# Patient Record
Sex: MALE | Race: WHITE | URBAN - METROPOLITAN AREA
[De-identification: names, ages, dates, MRNs, and addresses within clinical notes are randomized per-mention and may not be internally consistent; named-entity substitution may affect disease eponyms.]

---

## 2017-04-12 ENCOUNTER — OFFICE VISIT (OUTPATIENT)
Dept: CARDIOLOGY CLINIC | Age: 62
End: 2017-04-12

## 2017-04-12 VITALS
WEIGHT: 184 LBS | HEIGHT: 77 IN | OXYGEN SATURATION: 95 % | BODY MASS INDEX: 21.73 KG/M2 | DIASTOLIC BLOOD PRESSURE: 80 MMHG | HEART RATE: 84 BPM | SYSTOLIC BLOOD PRESSURE: 130 MMHG

## 2017-04-12 DIAGNOSIS — I48.0 PAROXYSMAL ATRIAL FIBRILLATION (HCC): Primary | ICD-10-CM

## 2017-04-12 DIAGNOSIS — Z79.899 LONG TERM CURRENT USE OF AMIODARONE: ICD-10-CM

## 2017-04-12 DIAGNOSIS — I42.9 CARDIOMYOPATHY (HCC): ICD-10-CM

## 2017-04-12 PROCEDURE — 93000 ELECTROCARDIOGRAM COMPLETE: CPT | Performed by: INTERNAL MEDICINE

## 2017-04-12 PROCEDURE — 99214 OFFICE O/P EST MOD 30 MIN: CPT | Performed by: INTERNAL MEDICINE

## 2017-04-12 RX ORDER — AMIODARONE HYDROCHLORIDE 200 MG/1
100 TABLET ORAL EVERY OTHER DAY
Qty: 15 TABLET | Refills: 6 | Status: SHIPPED | OUTPATIENT
Start: 2017-04-12 | End: 2017-07-25 | Stop reason: SDUPTHER

## 2017-04-12 ASSESSMENT — ENCOUNTER SYMPTOMS
BLOOD IN STOOL: 0
COUGH: 0
EYE REDNESS: 0
ABDOMINAL DISTENTION: 0
SHORTNESS OF BREATH: 0
WHEEZING: 0

## 2017-06-26 RX ORDER — METOPROLOL SUCCINATE 25 MG/1
25 TABLET, EXTENDED RELEASE ORAL DAILY
Qty: 30 TABLET | Refills: 6 | Status: SHIPPED | OUTPATIENT
Start: 2017-06-26 | End: 2018-01-29 | Stop reason: SDUPTHER

## 2017-07-25 RX ORDER — AMIODARONE HYDROCHLORIDE 200 MG/1
100 TABLET ORAL EVERY OTHER DAY
Qty: 15 TABLET | Refills: 6 | Status: SHIPPED | OUTPATIENT
Start: 2017-07-25 | End: 2018-10-03 | Stop reason: SDUPTHER

## 2017-10-12 ENCOUNTER — OFFICE VISIT (OUTPATIENT)
Dept: CARDIOLOGY CLINIC | Age: 62
End: 2017-10-12

## 2017-10-12 VITALS
HEIGHT: 77 IN | BODY MASS INDEX: 20.12 KG/M2 | SYSTOLIC BLOOD PRESSURE: 120 MMHG | OXYGEN SATURATION: 95 % | HEART RATE: 66 BPM | WEIGHT: 170.4 LBS | DIASTOLIC BLOOD PRESSURE: 82 MMHG

## 2017-10-12 DIAGNOSIS — I48.0 PAROXYSMAL ATRIAL FIBRILLATION (HCC): Primary | ICD-10-CM

## 2017-10-12 DIAGNOSIS — Z79.899 LONG TERM CURRENT USE OF AMIODARONE: ICD-10-CM

## 2017-10-12 DIAGNOSIS — I42.9 CARDIOMYOPATHY, UNSPECIFIED TYPE (HCC): ICD-10-CM

## 2017-10-12 PROCEDURE — 99214 OFFICE O/P EST MOD 30 MIN: CPT | Performed by: INTERNAL MEDICINE

## 2017-10-12 PROCEDURE — 93000 ELECTROCARDIOGRAM COMPLETE: CPT | Performed by: INTERNAL MEDICINE

## 2017-10-12 RX ORDER — FERROUS SULFATE 325(65) MG
325 TABLET ORAL 2 TIMES DAILY
COMMUNITY
End: 2018-04-12 | Stop reason: ALTCHOICE

## 2017-10-12 RX ORDER — ESCITALOPRAM OXALATE 5 MG/1
5 TABLET ORAL DAILY
COMMUNITY
End: 2018-04-12 | Stop reason: ALTCHOICE

## 2017-10-12 RX ORDER — SULFAMETHOXAZOLE AND TRIMETHOPRIM 800; 160 MG/1; MG/1
1 TABLET ORAL PRN
COMMUNITY

## 2017-10-12 ASSESSMENT — ENCOUNTER SYMPTOMS
SHORTNESS OF BREATH: 0
ABDOMINAL DISTENTION: 0
BLOOD IN STOOL: 0
EYE REDNESS: 0
COUGH: 0
WHEEZING: 0

## 2017-10-12 NOTE — PATIENT INSTRUCTIONS
shot. If you have had one before, ask your doctor whether you need another dose. Get a flu shot every year. If you must be around people with colds or flu, wash your hands often. Activity  · If your doctor recommends it, get more exercise. Walking is a good choice. Bit by bit, increase the amount you walk every day. Try for at least 30 minutes on most days of the week. You also may want to swim, bike, or do other activities. Your doctor may suggest that you join a cardiac rehabilitation program so that you can have help increasing your physical activity safely. · Start light exercise if your doctor says it is okay. Even a small amount will help you get stronger, have more energy, and manage stress. Walking is an easy way to get exercise. Start out by walking a little more than you did in the hospital. Gradually increase the amount you walk. · When you exercise, watch for signs that your heart is working too hard. You are pushing too hard if you cannot talk while you are exercising. If you become short of breath or dizzy or have chest pain, sit down and rest immediately. · Check your pulse regularly. Place two fingers on the artery at the palm side of your wrist, in line with your thumb. If your heartbeat seems uneven or fast, talk to your doctor. When should you call for help? Call 911 anytime you think you may need emergency care. For example, call if:  · You have symptoms of a heart attack. These may include:  ¨ Chest pain or pressure, or a strange feeling in the chest.  ¨ Sweating. ¨ Shortness of breath. ¨ Nausea or vomiting. ¨ Pain, pressure, or a strange feeling in the back, neck, jaw, or upper belly or in one or both shoulders or arms. ¨ Lightheadedness or sudden weakness. ¨ A fast or irregular heartbeat. After you call 911, the  may tell you to chew 1 adult-strength or 2 to 4 low-dose aspirin. Wait for an ambulance. Do not try to drive yourself. · You have symptoms of a stroke.  These may include:  ¨ Sudden numbness, tingling, weakness, or loss of movement in your face, arm, or leg, especially on only one side of your body. ¨ Sudden vision changes. ¨ Sudden trouble speaking. ¨ Sudden confusion or trouble understanding simple statements. ¨ Sudden problems with walking or balance. ¨ A sudden, severe headache that is different from past headaches. · You passed out (lost consciousness). Call your doctor now or seek immediate medical care if:  · You have new or increased shortness of breath. · You feel dizzy or lightheaded, or you feel like you may faint. · Your heart rate becomes irregular. · You can feel your heart flutter in your chest or skip heartbeats. Tell your doctor if these symptoms are new or worse. Watch closely for changes in your health, and be sure to contact your doctor if you have any problems. Where can you learn more? Go to https://[email or web address].HipLogic. org and sign in to your [email or web address] account. Enter U020 in the travelfox box to learn more about \"Atrial Fibrillation: Care Instructions. \"     If you do not have an account, please click on the \"Sign Up Now\" link. Current as of: September 21, 2016  Content Version: 11.3  © 3671-7037 [employer], [employer]. Care instructions adapted under license by [facility] ([facility]). If you have questions about a medical condition or this instruction, always ask your healthcare professional. [place] [address] any warranty or liability for your use of this information.

## 2017-10-12 NOTE — LETTER
[address] Cardiology - [address] [address]  [address] [place] [address]  [address] [address]  Phone: [phone]  Fax: [phone]    Abiola Son MD        October 12, 2017     David Walton    Patient: Nabil Middleton  MR Number: [id number]  YOB: 1955  Date of Visit: 10/12/2017    Dear Dr. Walker Areas:    HPI Nabil Middleton denies chest pain, palpitations, dizziness, syncope, leg swelling and increased dyspnea. He ambulates with a cane. He is tolerating his medications. He drinks 3-4 beers per day. Assessment:        Cardiomyopathy ([address]      Echo at PCP's office 10/2015-LVEF 25-30%. Nuc GXT 11/2015 57%. Cath 12/2015 LVEF 50%.  Atrial fibrillation ([address]- ?secondary to tachycardia or Etoh. CHADS VASC 0. TSH, Mg and K levels normal. On amiodarone for 2 episodes of symptomatic atrail fib. Consider switching to Flecainide in future. On ASA 325mg daily. EKG 4/2017 NSR. TSH normal. EKG 10/2017 NSR.  DVT (deep venous thrombosis) (HCC)      hx of, preivously on coumadin.  H/O spinal cord injury      with partial paralysis    ETOH abuse      avoidance discussed.  History of tobacco use      quit smoking    Leg swelling      chronic RLE edema per pt.  Polycystic kidney disease, Cr normal.          Chest pain, atypical.  Nuc GXT 11/2015 IWMI, no ischemia, LVEF 57%. Cath 12/2015 patent coronaries, LVEF 50%. Hx spinal cord injury. PLAN  Clinically stable. Currently in NSR. No evidence of CHF. No angina. Continue current medical therapy and ongoing risk factor modification. CMP, TSH, T3, T4 prior to next visit. Etoh avoidance discussed. If you have questions, please do not hesitate to call me. I look forward to following Nicole Grove along with you.     Sincerely,        Abiola Son MD

## 2017-10-12 NOTE — PROGRESS NOTES
Subjective:      Patient ID: Elmer Simmonds is a 58 y.o. male. Reason for visit: f/u atrial fib  CC: \"I am doing OK    HPI Elmer Simmonds denies chest pain, palpitations, dizziness, syncope, leg swelling and increased dyspnea. He ambulates with a cane. He is tolerating his medications. He drinks 3-4 beers per day. Review of Systems   Constitutional: Negative for activity change, appetite change, chills, fatigue, fever and unexpected weight change. HENT: Negative for congestion, nosebleeds and tinnitus. Eyes: Negative for redness and visual disturbance. Respiratory: Negative for cough, shortness of breath and wheezing. Cardiovascular: Negative for chest pain, palpitations and leg swelling. Gastrointestinal: Negative for abdominal distention and blood in stool. Genitourinary: Negative for dysuria and hematuria. Musculoskeletal: Negative for gait problem and myalgias. Neurological: Negative for dizziness and speech difficulty. Hematological: Does not bruise/bleed easily. Psychiatric/Behavioral: Negative for behavioral problems and confusion. All other systems reviewed and are negative. Objective:   Physical Exam   Constitutional: He is oriented to person, place, and time. He appears well-developed and well-nourished. HENT:   Head: Normocephalic and atraumatic. Eyes: Conjunctivae are normal. Right eye exhibits no discharge. Left eye exhibits no discharge. Neck: Normal range of motion. Neck supple. Cardiovascular: Normal rate, regular rhythm, normal heart sounds and intact distal pulses. Pulmonary/Chest: Effort normal and breath sounds normal.   Abdominal: Soft. Bowel sounds are normal.   Musculoskeletal: Normal range of motion. He exhibits no edema. Neurological: He is alert and oriented to person, place, and time. Skin: Skin is warm and dry. Psychiatric: He has a normal mood and affect. His behavior is normal.   Nursing note and vitals reviewed.     Blood pressure 120/82, pulse 66, height 6' 5\" (1.956 m), weight 170 lb 6.4 oz (77.3 kg), SpO2 95 %. Vitals:    10/12/17 1232   BP: 120/82   Site: Left Arm   Position: Sitting   Cuff Size: Medium Adult   Pulse: 66   SpO2: 95%   Weight: 170 lb 6.4 oz (77.3 kg)   Height: 6' 5\" (1.956 m)     Body mass index is 20.21 kg/m². Wt Readings from Last 3 Encounters:   10/12/17 170 lb 6.4 oz (77.3 kg)   04/12/17 184 lb (83.5 kg)   10/05/16 172 lb (78 kg)     BP Readings from Last 3 Encounters:   10/12/17 120/82   04/12/17 130/80   10/05/16 136/86        Current Outpatient Prescriptions   Medication Sig Dispense Refill    escitalopram (LEXAPRO) 5 MG tablet Take 5 mg by mouth daily      ferrous sulfate 325 (65 Fe) MG tablet Take 325 mg by mouth 2 times daily      sulfamethoxazole-trimethoprim (BACTRIM DS;SEPTRA DS) 800-160 MG per tablet Take 1 tablet by mouth as needed      amiodarone (CORDARONE) 200 MG tablet Take 0.5 tablets by mouth every other day 15 tablet 6    metoprolol succinate (TOPROL XL) 25 MG extended release tablet Take 1 tablet by mouth daily 30 tablet 6    aspirin 325 MG tablet Take 325 mg by mouth daily       No current facility-administered medications for this visit. Past Surgical History:   Procedure Laterality Date    EYE MUSCLE SURGERY      HERNIA REPAIR      JOINT REPLACEMENT      NECK SURGERY      PARTIAL HIP ARTHROPLASTY       Social History   Substance Use Topics    Smoking status: Former Smoker     Packs/day: 1.50     Years: 10.00     Quit date: 3/23/1986    Smokeless tobacco: Former User     Quit date: 3/23/2004    Alcohol use 12.6 oz/week     21 Cans of beer per week      Comment: about 3 beers qd      No Known Allergies   Family History   Problem Relation Age of Onset    Cancer Father      cancerous rib, lung & skin cancer    Cancer Mother      ovarian    Cancer Brother      lung        Recent labs and imaging reviewed.      Assessment:        Cardiomyopathy ([facility]      Echo at PCP's office 10/2015-LVEF 25-30%. Nuc GXT 11/2015 57%. Cath 12/2015 LVEF 50%.  Atrial fibrillation ([address]- ?secondary to tachycardia or Etoh. CHADS VASC 0. TSH, Mg and K levels normal. On amiodarone for 2 episodes of symptomatic atrail fib. Consider switching to Flecainide in future. On ASA 325mg daily. EKG 4/2017 NSR. TSH normal. EKG 10/2017 NSR.  DVT (deep venous thrombosis) (HCC)      hx of, preivously on coumadin.  H/O spinal cord injury      with partial paralysis    ETOH abuse      avoidance discussed.  History of tobacco use      quit smoking    Leg swelling      chronic RLE edema per pt.  Polycystic kidney disease, Cr normal.          Chest pain, atypical.  Nuc GXT 11/2015 IWMI, no ischemia, LVEF 57%. Cath 12/2015 patent coronaries, LVEF 50%. Hx spinal cord injury. PLAN  Clinically stable. Currently in NSR. No evidence of CHF. No angina. Continue current medical therapy and ongoing risk factor modification. CMP, TSH, T3, T4 prior to next visit. Etoh avoidance discussed.

## 2018-01-29 ENCOUNTER — TELEPHONE (OUTPATIENT)
Dept: CARDIOLOGY CLINIC | Age: 63
End: 2018-01-29

## 2018-01-29 RX ORDER — METOPROLOL SUCCINATE 25 MG/1
25 TABLET, EXTENDED RELEASE ORAL DAILY
Qty: 90 TABLET | Refills: 3 | Status: SHIPPED | OUTPATIENT
Start: 2018-01-29 | End: 2018-04-12 | Stop reason: SDUPTHER

## 2018-01-29 NOTE — TELEPHONE ENCOUNTER
Patient needs a refill on Metoprolol 25 mg daily #90 called to [facility] in [place]

## 2018-01-29 NOTE — TELEPHONE ENCOUNTER
Patient needs a refill on Metoprolol 25 mg daily #90 called to [facility] in [place].

## 2018-04-12 ENCOUNTER — OFFICE VISIT (OUTPATIENT)
Dept: CARDIOLOGY CLINIC | Age: 63
End: 2018-04-12

## 2018-04-12 VITALS
HEIGHT: 77 IN | WEIGHT: 186 LBS | OXYGEN SATURATION: 96 % | HEART RATE: 108 BPM | DIASTOLIC BLOOD PRESSURE: 82 MMHG | SYSTOLIC BLOOD PRESSURE: 126 MMHG | BODY MASS INDEX: 21.96 KG/M2

## 2018-04-12 DIAGNOSIS — Z79.899 LONG TERM CURRENT USE OF AMIODARONE: ICD-10-CM

## 2018-04-12 DIAGNOSIS — I48.0 PAROXYSMAL ATRIAL FIBRILLATION (HCC): ICD-10-CM

## 2018-04-12 DIAGNOSIS — I42.9 CARDIOMYOPATHY, UNSPECIFIED TYPE (HCC): Primary | ICD-10-CM

## 2018-04-12 DIAGNOSIS — E78.00 PURE HYPERCHOLESTEROLEMIA: ICD-10-CM

## 2018-04-12 PROCEDURE — 99214 OFFICE O/P EST MOD 30 MIN: CPT | Performed by: INTERNAL MEDICINE

## 2018-04-12 PROCEDURE — 93000 ELECTROCARDIOGRAM COMPLETE: CPT | Performed by: INTERNAL MEDICINE

## 2018-04-12 RX ORDER — METOPROLOL SUCCINATE 50 MG/1
50 TABLET, EXTENDED RELEASE ORAL DAILY
Qty: 90 TABLET | Refills: 3 | Status: SHIPPED | OUTPATIENT
Start: 2018-04-12 | End: 2019-04-29 | Stop reason: SDUPTHER

## 2018-04-12 ASSESSMENT — ENCOUNTER SYMPTOMS
EYE REDNESS: 0
BLOOD IN STOOL: 0
COUGH: 0
SHORTNESS OF BREATH: 0
ABDOMINAL DISTENTION: 0
WHEEZING: 0

## 2018-10-03 RX ORDER — AMIODARONE HYDROCHLORIDE 200 MG/1
TABLET ORAL
Qty: 23 TABLET | Refills: 3 | Status: SHIPPED | OUTPATIENT
Start: 2018-10-03 | End: 2019-05-07 | Stop reason: SDUPTHER

## 2018-10-24 ASSESSMENT — ENCOUNTER SYMPTOMS
NAUSEA: 0
WHEEZING: 0
BLOOD IN STOOL: 0
EYE REDNESS: 0
SHORTNESS OF BREATH: 0
COUGH: 0

## 2018-10-24 NOTE — PROGRESS NOTES
Mihaela Koo is a 61 y.o. male    Reason for Visit: f/u atrial fib, CM  CC: \"I am doing OK \"    HPI Mihaela Koo denies chest pain, palpitations, dizziness, syncope, leg swelling and increased dyspnea. He ambulates with a walker due to his spinal injury. Review of Systems   Constitutional: Negative for chills, diaphoresis and fever. HENT: Negative for congestion, nosebleeds and tinnitus. Eyes: Negative for redness. Respiratory: Negative for cough, shortness of breath and wheezing. Cardiovascular: Negative for chest pain, palpitations and leg swelling. Gastrointestinal: Negative for blood in stool and nausea. Genitourinary: Negative for dysuria and hematuria. Musculoskeletal: Negative for myalgias and neck pain. Neurological: Negative for dizziness. Hematological: Does not bruise/bleed easily. Physical Exam   Constitutional: He is oriented to person, place, and time. He appears well-developed and well-nourished. HENT:   Head: Normocephalic and atraumatic. Eyes: Conjunctivae and EOM are normal.   Neck: Normal range of motion. Neck supple. Cardiovascular: Normal rate, regular rhythm, S1 normal, S2 normal and normal heart sounds. Exam reveals no gallop. No murmur heard. Pulmonary/Chest: Effort normal and breath sounds normal.   Abdominal: Soft. Bowel sounds are normal.   Musculoskeletal: Normal range of motion. Neurological: He is alert and oriented to person, place, and time. Right hemiparesis   Skin: Skin is warm and dry. Psychiatric: He has a normal mood and affect. His behavior is normal.   Nursing note and vitals reviewed.       Wt Readings from Last 3 Encounters:   10/25/18 183 lb (83 kg)   04/12/18 186 lb (84.4 kg)   10/12/17 170 lb 6.4 oz (77.3 kg)     BP Readings from Last 3 Encounters:   10/25/18 124/88   04/12/18 126/82   10/12/17 120/82     Pulse Readings from Last 3 Encounters:   10/25/18 96   04/12/18 108   10/12/17 66         Current Outpatient

## 2018-10-25 ENCOUNTER — OFFICE VISIT (OUTPATIENT)
Dept: CARDIOLOGY CLINIC | Age: 63
End: 2018-10-25
Payer: MEDICARE

## 2018-10-25 VITALS
OXYGEN SATURATION: 93 % | HEART RATE: 96 BPM | DIASTOLIC BLOOD PRESSURE: 88 MMHG | HEIGHT: 77 IN | SYSTOLIC BLOOD PRESSURE: 124 MMHG | WEIGHT: 183 LBS | BODY MASS INDEX: 21.61 KG/M2

## 2018-10-25 DIAGNOSIS — Z79.899 LONG TERM CURRENT USE OF AMIODARONE: ICD-10-CM

## 2018-10-25 DIAGNOSIS — I42.9 CARDIOMYOPATHY, UNSPECIFIED TYPE (HCC): Primary | ICD-10-CM

## 2018-10-25 DIAGNOSIS — I48.0 PAROXYSMAL ATRIAL FIBRILLATION (HCC): ICD-10-CM

## 2018-10-25 PROCEDURE — 93000 ELECTROCARDIOGRAM COMPLETE: CPT | Performed by: INTERNAL MEDICINE

## 2018-10-25 PROCEDURE — 99214 OFFICE O/P EST MOD 30 MIN: CPT | Performed by: INTERNAL MEDICINE

## 2018-10-25 NOTE — LETTER
procedures, and medical decision making performed by me. If you have questions, please do not hesitate to call me. I look forward to following Uriel Coffman along with you.     Sincerely,        Miki Westbrook MD

## 2019-04-26 ENCOUNTER — TELEPHONE (OUTPATIENT)
Dept: CARDIOLOGY CLINIC | Age: 64
End: 2019-04-26

## 2019-04-29 ENCOUNTER — OFFICE VISIT (OUTPATIENT)
Dept: CARDIOLOGY CLINIC | Age: 64
End: 2019-04-29
Payer: COMMERCIAL

## 2019-04-29 VITALS
WEIGHT: 187.8 LBS | HEIGHT: 77 IN | BODY MASS INDEX: 22.17 KG/M2 | DIASTOLIC BLOOD PRESSURE: 60 MMHG | HEART RATE: 103 BPM | OXYGEN SATURATION: 95 % | SYSTOLIC BLOOD PRESSURE: 100 MMHG

## 2019-04-29 DIAGNOSIS — I48.0 PAROXYSMAL ATRIAL FIBRILLATION (HCC): Primary | ICD-10-CM

## 2019-04-29 DIAGNOSIS — Z79.899 LONG TERM CURRENT USE OF AMIODARONE: ICD-10-CM

## 2019-04-29 DIAGNOSIS — I42.9 CARDIOMYOPATHY, UNSPECIFIED TYPE (HCC): ICD-10-CM

## 2019-04-29 PROCEDURE — 99214 OFFICE O/P EST MOD 30 MIN: CPT | Performed by: INTERNAL MEDICINE

## 2019-04-29 PROCEDURE — 93000 ELECTROCARDIOGRAM COMPLETE: CPT | Performed by: INTERNAL MEDICINE

## 2019-04-29 RX ORDER — METOPROLOL SUCCINATE 50 MG/1
50 TABLET, EXTENDED RELEASE ORAL DAILY
Qty: 90 TABLET | Refills: 3 | Status: SHIPPED | OUTPATIENT
Start: 2019-04-29 | End: 2019-05-07 | Stop reason: SDUPTHER

## 2019-04-29 ASSESSMENT — ENCOUNTER SYMPTOMS
BLOOD IN STOOL: 0
SHORTNESS OF BREATH: 0
EYE REDNESS: 0
WHEEZING: 0
COUGH: 0
NAUSEA: 0

## 2019-04-29 NOTE — PROGRESS NOTES
Gabby Alex is a 61 y.o. male    Reason for Visit: f/u atrial fib, CM    HPI Gabby Alex denies chest pain, palpitations, dizziness, syncope, leg swelling and increased dyspnea. He ambulates with a cane due to his spinal injury. Review of Systems   Constitutional: Negative for chills, diaphoresis and fever. HENT: Negative for congestion, nosebleeds and tinnitus. Eyes: Negative for redness. Respiratory: Negative for cough, shortness of breath and wheezing. Cardiovascular: Negative for chest pain, palpitations and leg swelling. Gastrointestinal: Negative for blood in stool and nausea. Genitourinary: Negative for dysuria and hematuria. Musculoskeletal: Negative for myalgias and neck pain. Neurological: Negative for dizziness. Hematological: Does not bruise/bleed easily. Physical Exam   Constitutional: He is oriented to person, place, and time. He appears well-developed and well-nourished. HENT:   Head: Normocephalic and atraumatic. Eyes: Conjunctivae and EOM are normal.   Neck: Normal range of motion. Neck supple. Cardiovascular: Regular rhythm, S1 normal, S2 normal and normal heart sounds. Exam reveals no gallop. No murmur heard. Tachycardic   Pulmonary/Chest: Effort normal and breath sounds normal.   Abdominal: Soft. Bowel sounds are normal.   Musculoskeletal: Normal range of motion. Neurological: He is alert and oriented to person, place, and time. Right hemiparesis   Skin: Skin is warm and dry. Psychiatric: He has a normal mood and affect. His behavior is normal.   Nursing note and vitals reviewed.       Wt Readings from Last 3 Encounters:   04/29/19 187 lb 12.8 oz (85.2 kg)   10/25/18 183 lb (83 kg)   04/12/18 186 lb (84.4 kg)     BP Readings from Last 3 Encounters:   04/29/19 100/60   10/25/18 124/88   04/12/18 126/82     Pulse Readings from Last 3 Encounters:   04/29/19 103   10/25/18 96   04/12/18 108         Current Outpatient Medications:     amiodarone (CORDARONE) 200 MG tablet, TAKE ONE-HALF TABLET BY MOUTH EVERY OTHER DAY, Disp: 23 tablet, Rfl: 3    metoprolol succinate (TOPROL XL) 50 MG extended release tablet, Take 1 tablet by mouth daily, Disp: 90 tablet, Rfl: 3    sulfamethoxazole-trimethoprim (BACTRIM DS;SEPTRA DS) 800-160 MG per tablet, Take 1 tablet by mouth as needed, Disp: , Rfl:     aspirin 325 MG tablet, Take 325 mg by mouth daily, Disp: , Rfl:     Past Medical History:   Diagnosis Date    Atrial fibrillation (HCC)     Brief episodes, CHADS VASC 0. TSH, Mg and K levels normal. On ASA 325mg daily.  Cardiomyopathy ([address]     Echo 10/2015-    DVT (deep venous thrombosis) (HCC)     hx of, preivously on coumadin.  ETOH abuse     moderation discussed.  H/O spinal cord injury     with partial paralysis    History of tobacco use     quit smoking    Leg swelling     chronic RLE edema per pt.  Polycystic kidney disease        Social History     Socioeconomic History    Marital status:      Spouse name: None    Number of children: None    Years of education: None    Highest education level: None   Occupational History    None   Social Needs    Financial resource strain: None    Food insecurity:     Worry: None     Inability: None    Transportation needs:     Medical: None     Non-medical: None   Tobacco Use    Smoking status: Former Smoker     Packs/day: 1.50     Years: 10.00     Pack years: 15.00     Last attempt to quit: 3/23/1986     Years since quittin.1    Smokeless tobacco: Former User     Quit date: 3/23/2004   Substance and Sexual Activity    Alcohol use:  Yes     Alcohol/week: 12.6 oz     Types: 21 Cans of beer per week     Comment: about 3 beers qd     Drug use: No    Sexual activity: None   Lifestyle    Physical activity:     Days per week: None     Minutes per session: None    Stress: None   Relationships    Social connections:     Talks on phone: None     Gets together: None     Attends Mormon service: None     Active member of club or organization: None     Attends meetings of clubs or organizations: None     Relationship status: None    Intimate partner violence:     Fear of current or ex partner: None     Emotionally abused: None     Physically abused: None     Forced sexual activity: None   Other Topics Concern    None   Social History Narrative    None       Past Surgical History:   Procedure Laterality Date    EYE MUSCLE SURGERY      HERNIA REPAIR      JOINT REPLACEMENT      NECK SURGERY      PARTIAL HIP ARTHROPLASTY         Family History   Problem Relation Age of Onset    Cancer Father         cancerous rib, lung & skin cancer    Cancer Mother         ovarian    Cancer Brother         lung       No Known Allergies    Recent Labs and Imaging reviewed    Assessment   Cardiomyopathy ([address]      Echo at PCP's office 10/2015-LVEF 25-30%. Nuc GXT 11/2015 57%. Cath 12/2015 LVEF 50%.  Atrial fibrillation ([address]- ?secondary to tachycardia or Etoh. CHADS VASC 1. TSH, Mg and K levels normal. On amiodarone for 2 episodes of symptomatic atrail fib. Consider switching to Flecainide in future. On ASA 325mg daily. EKG 10/25/18 NSR. TSH normal 4/2019. EKG 4/29/19 ST.     DVT (deep venous thrombosis) (HCC)      hx of.    H/O spinal cord injury      with partial paralysis.  ETOH abuse      avoidance discussed.  History of tobacco use      quit smoking    Leg swelling      chronic RLE edema per pt.  Polycystic kidney disease, Cr normal.          Chest pain, atypical.  Nuc GXT 11/2015 IWMI, no ischemia, LVEF 57%. Cath 12/2015 patent coronaries, LVEF 50%. Plan  EKG today confirms ST. CHF appears to be compensated. No angina. Continue ASA, amiodarone 100mg QOD and BB. Recommend repeating echocardiogram to evaluate progression of CM. CMP and TSH prior to next visit. Return in about 6 months (around 10/29/2019).     This note was scribed in the presence of the physician by Jaspreet Guerin RN. The scribes documentation has been prepared under my direction and personally reviewed by me in its entirety. I confirm that the note above accurately reflects all work, treatment, procedures, and medical decision making performed by me.

## 2019-04-29 NOTE — PATIENT INSTRUCTIONS

## 2019-04-29 NOTE — LETTER
[address] Cardiology - [facility] [address]  [address]   [address] [address]  Phone: [phone]  Fax: [phone]    Hesham Castañeda MD        May 1, 2019     Miriam Calvillo  [address] [place], [place] [address]    Patient: Ottoniel Dickerson  MR Number: [id number]  YOB: 1955  Date of Visit: 4/29/2019    Dear Dr. Miriam Calvillo:    Thank you for your referral. Progress note attached in visit summary. If you have questions, please do not hesitate to call me. I look forward to following Maura Rothman along with you.     Sincerely,        Hesham Castañeda MD

## 2019-05-07 RX ORDER — AMIODARONE HYDROCHLORIDE 200 MG/1
TABLET ORAL
Qty: 23 TABLET | Refills: 3 | Status: SHIPPED | OUTPATIENT
Start: 2019-05-07 | End: 2019-05-10 | Stop reason: SDUPTHER

## 2019-05-07 RX ORDER — METOPROLOL SUCCINATE 50 MG/1
50 TABLET, EXTENDED RELEASE ORAL DAILY
Qty: 90 TABLET | Refills: 3 | Status: SHIPPED | OUTPATIENT
Start: 2019-05-07 | End: 2019-05-10 | Stop reason: SDUPTHER

## 2019-05-10 RX ORDER — METOPROLOL SUCCINATE 50 MG/1
50 TABLET, EXTENDED RELEASE ORAL DAILY
Qty: 90 TABLET | Refills: 3 | Status: SHIPPED | OUTPATIENT
Start: 2019-05-10 | End: 2020-06-01

## 2019-05-10 RX ORDER — AMIODARONE HYDROCHLORIDE 200 MG/1
TABLET ORAL
Qty: 23 TABLET | Refills: 3 | Status: SHIPPED | OUTPATIENT
Start: 2019-05-10 | End: 2020-05-26

## 2019-05-13 ENCOUNTER — TELEPHONE (OUTPATIENT)
Dept: CARDIOLOGY CLINIC | Age: 64
End: 2019-05-13

## 2019-11-19 ENCOUNTER — TELEPHONE (OUTPATIENT)
Dept: CARDIOLOGY CLINIC | Age: 64
End: 2019-11-19

## 2019-11-20 ENCOUNTER — OFFICE VISIT (OUTPATIENT)
Dept: CARDIOLOGY CLINIC | Age: 64
End: 2019-11-20
Payer: COMMERCIAL

## 2019-11-20 VITALS
WEIGHT: 175.8 LBS | DIASTOLIC BLOOD PRESSURE: 80 MMHG | OXYGEN SATURATION: 96 % | SYSTOLIC BLOOD PRESSURE: 120 MMHG | BODY MASS INDEX: 20.76 KG/M2 | HEART RATE: 96 BPM | HEIGHT: 77 IN

## 2019-11-20 DIAGNOSIS — I48.0 PAROXYSMAL ATRIAL FIBRILLATION (HCC): Primary | ICD-10-CM

## 2019-11-20 DIAGNOSIS — I42.9 CARDIOMYOPATHY, UNSPECIFIED TYPE (HCC): ICD-10-CM

## 2019-11-20 DIAGNOSIS — Z79.899 LONG TERM CURRENT USE OF AMIODARONE: ICD-10-CM

## 2019-11-20 PROCEDURE — 99214 OFFICE O/P EST MOD 30 MIN: CPT | Performed by: INTERNAL MEDICINE

## 2019-11-20 PROCEDURE — 93000 ELECTROCARDIOGRAM COMPLETE: CPT | Performed by: INTERNAL MEDICINE

## 2019-11-20 ASSESSMENT — ENCOUNTER SYMPTOMS
NAUSEA: 0
WHEEZING: 0
SHORTNESS OF BREATH: 0
COUGH: 0
BLOOD IN STOOL: 0
EYE REDNESS: 0

## 2020-05-26 RX ORDER — AMIODARONE HYDROCHLORIDE 200 MG/1
TABLET ORAL
Qty: 23 TABLET | Refills: 3 | Status: SHIPPED | OUTPATIENT
Start: 2020-05-26

## 2020-06-01 RX ORDER — METOPROLOL SUCCINATE 50 MG/1
TABLET, EXTENDED RELEASE ORAL
Qty: 90 TABLET | Refills: 3 | Status: SHIPPED | OUTPATIENT
Start: 2020-06-01 | End: 2020-08-19

## 2020-08-11 ASSESSMENT — ENCOUNTER SYMPTOMS
BLOOD IN STOOL: 0
COUGH: 0
NAUSEA: 0
SHORTNESS OF BREATH: 0
EYE REDNESS: 0
WHEEZING: 0

## 2020-08-11 NOTE — PROGRESS NOTES
(85.2 kg)     BP Readings from Last 3 Encounters:   20 100/60   19 120/80   19 100/60     Pulse Readings from Last 3 Encounters:   20 90   19 96   19 103       Current Outpatient Medications:     metoprolol succinate (TOPROL XL) 50 MG extended release tablet, TAKE 1 TABLET EVERY DAY, Disp: 90 tablet, Rfl: 3    amiodarone (CORDARONE) 200 MG tablet, TAKE 1/2 TABLET EVERY OTHER DAY, Disp: 23 tablet, Rfl: 3    sulfamethoxazole-trimethoprim (BACTRIM DS;SEPTRA DS) 800-160 MG per tablet, Take 1 tablet by mouth as needed, Disp: , Rfl:     aspirin 325 MG tablet, Take 325 mg by mouth daily, Disp: , Rfl:     Past Medical History:   Diagnosis Date    Atrial fibrillation (HCC)     Brief episodes, CHADS VASC 0. TSH, Mg and K levels normal. On ASA 325mg daily.  Cardiomyopathy ([address]     Echo 10/2015-    DVT (deep venous thrombosis) (HCC)     hx of, preivously on coumadin.  ETOH abuse     moderation discussed.  H/O spinal cord injury     with partial paralysis    History of tobacco use     quit smoking    Hyperlipidemia     Leg swelling     chronic RLE edema per pt.  Polycystic kidney disease        Social History     Socioeconomic History    Marital status:      Spouse name: None    Number of children: None    Years of education: None    Highest education level: None   Occupational History    None   Social Needs    Financial resource strain: None    Food insecurity     Worry: None     Inability: None    Transportation needs     Medical: None     Non-medical: None   Tobacco Use    Smoking status: Former Smoker     Packs/day: 1.50     Years: 10.00     Pack years: 15.00     Last attempt to quit: 3/23/1986     Years since quittin.4    Smokeless tobacco: Former User     Quit date: 3/23/2004   Substance and Sexual Activity    Alcohol use:  Yes     Alcohol/week: 21.0 standard drinks     Types: 21 Cans of beer per week     Comment: about 3 beers qd     Drug use: No    Sexual activity: None   Lifestyle    Physical activity     Days per week: None     Minutes per session: None    Stress: None   Relationships    Social connections     Talks on phone: None     Gets together: None     Attends Hinduism service: None     Active member of club or organization: None     Attends meetings of clubs or organizations: None     Relationship status: None    Intimate partner violence     Fear of current or ex partner: None     Emotionally abused: None     Physically abused: None     Forced sexual activity: None   Other Topics Concern    None   Social History Narrative    None       Past Surgical History:   Procedure Laterality Date    EYE MUSCLE SURGERY      HERNIA REPAIR      JOINT REPLACEMENT      NECK SURGERY      PARTIAL HIP ARTHROPLASTY         Family History   Problem Relation Age of Onset    Cancer Father         cancerous rib, lung & skin cancer    Cancer Mother         ovarian    Cancer Brother         lung       No Known Allergies    Recent Labs and Imaging reviewed    Assessment   Cardiomyopathy ([address]      Echo 10/2015-LVEF 25-30%. Nuc GXT 11/2015 57%. Cath 12/2015 LVEF 50%. Echo 5/2019 LVEF normal.       Atrial fibrillation ([address]- ?secondary to tachycardia or Etoh. CHADS VASC 1 soon to be 2 due to age. On amiodarone for 2 episodes of symptomatic atrail fib. Consider switching to Flecainide in future. On ASA 325mg daily. EKG 4/29/19 ST. EKG 11/2019 NSR. TSH and K normal 8/2020      DVT (deep venous thrombosis) (HCC)      hx of.      H/O spinal cord injury      with partial paralysis.  ETOH abuse      avoidance discussed.  History of tobacco use      quit smoking      Leg swelling      chronic RLE edema per pt.  Polycystic kidney disease, Cr normal.            Chest pain, atypical.  Nuc GXT 11/2015 IWMI, no ischemia, LVEF 57%. Cath 12/2015 patent coronaries, LVEF 50%. Plan  Currently in regular rhythm. CHF improved. No angina. Ongoing risk factor modification discussed. Continue ASA, Amiodarone 100mg QOD and BB. CMP and TSH, T3, T4 prior to next visit. Return in about 6 months (around 2/19/2021). This note was scribed in the presence of the physician by Pam Hair RN. The scribes documentation has been prepared under my direction and personally reviewed by me in its entirety. I confirm that the note above accurately reflects all work, treatment, procedures, and medical decision making performed by me.

## 2020-08-18 ENCOUNTER — TELEPHONE (OUTPATIENT)
Dept: CARDIOLOGY CLINIC | Age: 65
End: 2020-08-18

## 2020-08-19 ENCOUNTER — OFFICE VISIT (OUTPATIENT)
Dept: CARDIOLOGY CLINIC | Age: 65
End: 2020-08-19
Payer: COMMERCIAL

## 2020-08-19 VITALS
OXYGEN SATURATION: 98 % | DIASTOLIC BLOOD PRESSURE: 60 MMHG | SYSTOLIC BLOOD PRESSURE: 100 MMHG | HEIGHT: 77 IN | BODY MASS INDEX: 20.59 KG/M2 | TEMPERATURE: 97.8 F | HEART RATE: 90 BPM | WEIGHT: 174.4 LBS

## 2020-08-19 PROCEDURE — 99214 OFFICE O/P EST MOD 30 MIN: CPT | Performed by: INTERNAL MEDICINE

## 2020-08-19 RX ORDER — METOPROLOL SUCCINATE 25 MG/1
25 TABLET, EXTENDED RELEASE ORAL DAILY
Qty: 90 TABLET | Refills: 3
Start: 2020-08-19

## 2020-08-19 NOTE — PATIENT INSTRUCTIONS
Patient Education        Heart-Healthy Diet: Care Instructions  Your Care Instructions     A heart-healthy diet has lots of vegetables, fruits, nuts, beans, and whole grains, and is low in salt. It limits foods that are high in saturated fat, such as meats, cheeses, and fried foods. It may be hard to change your diet, but even small changes can lower your risk of heart attack and heart disease. Follow-up care is a key part of your treatment and safety. Be sure to make and go to all appointments, and call your doctor if you are having problems. It's also a good idea to know your test results and keep a list of the medicines you take. How can you care for yourself at home? Watch your portions  · Learn what a serving is. A \"serving\" and a \"portion\" are not always the same thing. Make sure that you are not eating larger portions than are recommended. For example, a serving of pasta is ½ cup. A serving size of meat is 2 to 3 ounces. A 3-ounce serving is about the size of a deck of cards. Measure serving sizes until you are good at [employer]" them. Keep in mind that restaurants often serve portions that are 2 or 3 times the size of one serving. · To keep your energy level up and keep you from feeling hungry, eat often but in smaller portions. · Eat only the number of calories you need to stay at a healthy weight. If you need to lose weight, eat fewer calories than your body burns (through exercise and other physical activity). Eat more fruits and vegetables  · Eat a variety of fruit and vegetables every day. Dark green, deep orange, red, or yellow fruits and vegetables are especially good for you. Examples include spinach, carrots, peaches, and berries. · Keep carrots, celery, and other veggies handy for snacks. Buy fruit that is in season and store it where you can see it so that you will be tempted to eat it. · Cook dishes that have a lot of veggies in them, such as stir-fries and soups.   Limit saturated and trans fat  · Read food labels, and try to avoid saturated and trans fats. They increase your risk of heart disease. · Use olive or canola oil when you cook. · Bake, broil, grill, or steam foods instead of frying them. · Choose lean meats instead of high-fat meats such as hot dogs and sausages. Cut off all visible fat when you prepare meat. · Eat fish, skinless poultry, and meat alternatives such as soy products instead of high-fat meats. Soy products, such as tofu, may be especially good for your heart. · Choose low-fat or fat-free milk and dairy products. Eat foods high in fiber  · Eat a variety of grain products every day. Include whole-grain foods that have lots of fiber and nutrients. Examples of whole-grain foods include oats, whole wheat bread, and brown rice. · Buy whole-grain breads and cereals, instead of white bread or pastries. Limit salt and sodium  · Limit how much salt and sodium you eat to help lower your blood pressure. · Taste food before you salt it. Add only a little salt when you think you need it. With time, your taste buds will adjust to less salt. · Eat fewer snack items, fast foods, and other high-salt, processed foods. Check food labels for the amount of sodium in packaged foods. · Choose low-sodium versions of canned goods (such as soups, vegetables, and beans). Limit sugar  · Limit drinks and foods with added sugar. These include candy, desserts, and soda pop. Limit alcohol  · Limit alcohol to no more than 2 drinks a day for men and 1 drink a day for women. Too much alcohol can cause health problems. When should you call for help? Watch closely for changes in your health, and be sure to contact your doctor if:  · You would like help planning heart-healthy meals. Where can you learn more? Go to https://chberteb[email or web address]. org and sign in to your [employer] account.  Enter V137 in the [employer] box to learn more about \"Heart-Healthy Diet: Care Instructions. \"     If you do not have an account, please click on the \"Sign Up Now\" link. Current as of: August 22, 2019               Content Version: 12.5  © 9664-1722 [employer]. Care instructions adapted under license by [facility] ([facility]). If you have questions about a medical condition or this instruction, always ask your healthcare professional. [address] any warranty or liability for your use of this information.

## 2020-08-19 NOTE — LETTER
[facility] - [address]  [address] [id number]  Phone: [phone]  Fax: [phone]    Parish Cuevas MD        September 2, 2020     [facility]    Patient: Nancy Bush  MR Number: <[id number]>  YOB: 1955  Date of Visit: 8/19/2020    Dear Dr. Marcelo Hernandez:    Thank you for your referral. Progress note attached in visit summary. If you have questions, please do not hesitate to call me. I look forward to following Katie Ye along with you.     Sincerely,        Parish Cuevas MD